# Patient Record
Sex: MALE | Race: BLACK OR AFRICAN AMERICAN | NOT HISPANIC OR LATINO
[De-identification: names, ages, dates, MRNs, and addresses within clinical notes are randomized per-mention and may not be internally consistent; named-entity substitution may affect disease eponyms.]

---

## 2023-03-27 ENCOUNTER — APPOINTMENT (OUTPATIENT)
Dept: SURGERY | Facility: CLINIC | Age: 73
End: 2023-03-27
Payer: MEDICARE

## 2023-03-27 VITALS
HEART RATE: 60 BPM | BODY MASS INDEX: 26.66 KG/M2 | WEIGHT: 180 LBS | HEIGHT: 69 IN | DIASTOLIC BLOOD PRESSURE: 87 MMHG | TEMPERATURE: 98.2 F | SYSTOLIC BLOOD PRESSURE: 136 MMHG

## 2023-03-27 DIAGNOSIS — K40.90 UNILATERAL INGUINAL HERNIA, W/OUT OBSTRUCTION OR GANGRENE, NOT SPECIFIED AS RECURRENT: ICD-10-CM

## 2023-03-27 DIAGNOSIS — Z78.9 OTHER SPECIFIED HEALTH STATUS: ICD-10-CM

## 2023-03-27 PROCEDURE — 99203 OFFICE O/P NEW LOW 30 MIN: CPT

## 2023-03-27 RX ORDER — AMLODIPINE BESYLATE 5 MG/1
TABLET ORAL
Refills: 0 | Status: ACTIVE | COMMUNITY

## 2023-03-27 RX ORDER — LOSARTAN POTASSIUM 100 MG/1
TABLET, FILM COATED ORAL
Refills: 0 | Status: ACTIVE | COMMUNITY

## 2023-03-27 RX ORDER — TAMSULOSIN HYDROCHLORIDE 0.4 MG/1
0.4 CAPSULE ORAL
Refills: 0 | Status: ACTIVE | COMMUNITY

## 2023-03-27 RX ORDER — ATORVASTATIN CALCIUM 10 MG/1
10 TABLET, FILM COATED ORAL
Refills: 0 | Status: ACTIVE | COMMUNITY

## 2023-03-27 NOTE — PHYSICAL EXAM
[Respiratory Effort] : normal respiratory effort [Alert] : alert [Oriented to Person] : oriented to person [Oriented to Place] : oriented to place [Oriented to Time] : oriented to time [Calm] : calm [JVD] : no jugular venous distention  [Abdominal Masses] : No abdominal masses [Abdomen Tenderness] : ~T ~M No abdominal tenderness [de-identified] : SAMIA JACKSON NAD [de-identified] : EOMI [de-identified] : soft NT, ND + reducible RIH no recurrent LIH appreciated.

## 2023-03-27 NOTE — HISTORY OF PRESENT ILLNESS
[de-identified] : 71 yo male with h/o HTN, BPH, and HLD with h/o open LIH in 2013 presents today for evaluation of a right groin bulge which he states he has had for several years. He states he is now symptomatic and has to hold it in when doing certain activities. He denies any nausea or vomiting.

## 2023-04-07 ENCOUNTER — OUTPATIENT (OUTPATIENT)
Dept: OUTPATIENT SERVICES | Facility: HOSPITAL | Age: 73
LOS: 1 days | Discharge: HOME | End: 2023-04-07
Payer: MEDICARE

## 2023-04-07 VITALS
DIASTOLIC BLOOD PRESSURE: 81 MMHG | WEIGHT: 189.82 LBS | HEIGHT: 69 IN | HEART RATE: 61 BPM | TEMPERATURE: 98 F | SYSTOLIC BLOOD PRESSURE: 145 MMHG | OXYGEN SATURATION: 98 % | RESPIRATION RATE: 17 BRPM

## 2023-04-07 DIAGNOSIS — K40.90 UNILATERAL INGUINAL HERNIA, WITHOUT OBSTRUCTION OR GANGRENE, NOT SPECIFIED AS RECURRENT: ICD-10-CM

## 2023-04-07 DIAGNOSIS — Z01.818 ENCOUNTER FOR OTHER PREPROCEDURAL EXAMINATION: ICD-10-CM

## 2023-04-07 DIAGNOSIS — Z98.890 OTHER SPECIFIED POSTPROCEDURAL STATES: Chronic | ICD-10-CM

## 2023-04-07 DIAGNOSIS — E78.5 HYPERLIPIDEMIA, UNSPECIFIED: ICD-10-CM

## 2023-04-07 DIAGNOSIS — Z87.438 PERSONAL HISTORY OF OTHER DISEASES OF MALE GENITAL ORGANS: ICD-10-CM

## 2023-04-07 DIAGNOSIS — I10 ESSENTIAL (PRIMARY) HYPERTENSION: ICD-10-CM

## 2023-04-07 LAB
ANION GAP SERPL CALC-SCNC: 3 MMOL/L — LOW (ref 5–17)
BASOPHILS # BLD AUTO: 0.04 K/UL — SIGNIFICANT CHANGE UP (ref 0–0.2)
BASOPHILS NFR BLD AUTO: 0.6 % — SIGNIFICANT CHANGE UP (ref 0–2)
BUN SERPL-MCNC: 18 MG/DL — SIGNIFICANT CHANGE UP (ref 7–23)
CALCIUM SERPL-MCNC: 10.5 MG/DL — HIGH (ref 8.5–10.1)
CHLORIDE SERPL-SCNC: 109 MMOL/L — HIGH (ref 96–108)
CO2 SERPL-SCNC: 30 MMOL/L — SIGNIFICANT CHANGE UP (ref 22–31)
CREAT SERPL-MCNC: 1.43 MG/DL — HIGH (ref 0.5–1.3)
EGFR: 52 ML/MIN/1.73M2 — LOW
EOSINOPHIL # BLD AUTO: 0.07 K/UL — SIGNIFICANT CHANGE UP (ref 0–0.5)
EOSINOPHIL NFR BLD AUTO: 1 % — SIGNIFICANT CHANGE UP (ref 0–6)
GLUCOSE SERPL-MCNC: 113 MG/DL — HIGH (ref 70–99)
HCT VFR BLD CALC: 44.2 % — SIGNIFICANT CHANGE UP (ref 39–50)
HGB BLD-MCNC: 14.3 G/DL — SIGNIFICANT CHANGE UP (ref 13–17)
IMM GRANULOCYTES NFR BLD AUTO: 0.1 % — SIGNIFICANT CHANGE UP (ref 0–0.9)
LYMPHOCYTES # BLD AUTO: 1.23 K/UL — SIGNIFICANT CHANGE UP (ref 1–3.3)
LYMPHOCYTES # BLD AUTO: 18.4 % — SIGNIFICANT CHANGE UP (ref 13–44)
MCHC RBC-ENTMCNC: 29.7 PG — SIGNIFICANT CHANGE UP (ref 27–34)
MCHC RBC-ENTMCNC: 32.4 G/DL — SIGNIFICANT CHANGE UP (ref 32–36)
MCV RBC AUTO: 91.7 FL — SIGNIFICANT CHANGE UP (ref 80–100)
MONOCYTES # BLD AUTO: 0.65 K/UL — SIGNIFICANT CHANGE UP (ref 0–0.9)
MONOCYTES NFR BLD AUTO: 9.7 % — SIGNIFICANT CHANGE UP (ref 2–14)
NEUTROPHILS # BLD AUTO: 4.69 K/UL — SIGNIFICANT CHANGE UP (ref 1.8–7.4)
NEUTROPHILS NFR BLD AUTO: 70.2 % — SIGNIFICANT CHANGE UP (ref 43–77)
NRBC # BLD: 0 /100 WBCS — SIGNIFICANT CHANGE UP (ref 0–0)
PLATELET # BLD AUTO: 209 K/UL — SIGNIFICANT CHANGE UP (ref 150–400)
POTASSIUM SERPL-MCNC: 4.1 MMOL/L — SIGNIFICANT CHANGE UP (ref 3.5–5.3)
POTASSIUM SERPL-SCNC: 4.1 MMOL/L — SIGNIFICANT CHANGE UP (ref 3.5–5.3)
RBC # BLD: 4.82 M/UL — SIGNIFICANT CHANGE UP (ref 4.2–5.8)
RBC # FLD: 13.7 % — SIGNIFICANT CHANGE UP (ref 10.3–14.5)
SODIUM SERPL-SCNC: 142 MMOL/L — SIGNIFICANT CHANGE UP (ref 135–145)
WBC # BLD: 6.69 K/UL — SIGNIFICANT CHANGE UP (ref 3.8–10.5)
WBC # FLD AUTO: 6.69 K/UL — SIGNIFICANT CHANGE UP (ref 3.8–10.5)

## 2023-04-07 PROCEDURE — 93010 ELECTROCARDIOGRAM REPORT: CPT

## 2023-04-07 NOTE — H&P PST ADULT - PROBLEM SELECTOR PLAN 5
Preop instructions provided including NPO status. Hibiclens wash for infection control. Patient aware to stop NSAID, OTC herbals  for 7-10 days, needs to be accompanied  by adult upon discharge.  Patient verbalized understanding  medcial clearance   anesthesiologist to review pst labs, ekg, medical clearances and optimization for surgery

## 2023-04-07 NOTE — H&P PST ADULT - NSANTHOSAYNRD_GEN_A_CORE
No. NOVA screening performed.  STOP BANG Legend: 0-2 = LOW Risk; 3-4 = INTERMEDIATE Risk; 5-8 = HIGH Risk

## 2023-04-07 NOTE — H&P PST ADULT - NSICDXPASTMEDICALHX_GEN_ALL_CORE_FT
PAST MEDICAL HISTORY:  History of BPH     HLD (hyperlipidemia)     Hypertension     Osteoarthritis of hip

## 2023-04-07 NOTE — H&P PST ADULT - ASSESSMENT
right inguinal hernia   CAPRINI SCORE    AGE RELATED RISK FACTORS                                                       MOBILITY RELATED FACTORS  [ ] Age 41-60 years                                            (1 Point)                  [ ] Bed rest                                                        (1 Point)  [x ] Age: 61-74 years                                           (2 Points)                [ ] Plaster cast                                                   (2 Points)  [ ] Age= 75 years                                              (3 Points)                 [ ] Bed bound for more than 72 hours                   (2 Points)    DISEASE RELATED RISK FACTORS                                               GENDER SPECIFIC FACTORS  [ ] Edema in the lower extremities                       (1 Point)                  [ ] Pregnancy                                                     (1 Point)  [ ] Varicose veins                                               (1 Point)                  [ ] Post-partum < 6 weeks                                   (1 Point)             [ x] BMI > 25 Kg/m2                                            (1 Point)                  [ ] Hormonal therapy  or oral contraception            (1 Point)                 [ ] Sepsis (in the previous month)                        (1 Point)                  [ ] History of pregnancy complications  [ ] Pneumonia or serious lung disease                                               [ ] Unexplained or recurrent                       (1 Point)           (in the previous month)                               (1 Point)  [ ] Abnormal pulmonary function test                     (1 Point)                 SURGERY RELATED RISK FACTORS  [ ] Acute myocardial infarction                              (1 Point)                 [ ]  Section                                            (1 Point)  [ ] Congestive heart failure (in the previous month)  (1 Point)                 [ ] Minor surgery                                                 (1 Point)   [ ] Inflammatory bowel disease                             (1 Point)                 [ ] Arthroscopic surgery                                        (2 Points)  [ ] Central venous access                                    (2 Points)                [x ] General surgery lasting more than 45 minutes   (2 Points)       [ ] Stroke (in the previous month)                          (5 Points)               [ ] Elective arthroplasty                                        (5 Points)                                                                                                                                               HEMATOLOGY RELATED FACTORS                                                 TRAUMA RELATED RISK FACTORS  [ ] Prior episodes of VTE                                     (3 Points)                 [ ] Fracture of the hip, pelvis, or leg                       (5 Points)  [ ] Positive family history for VTE                         (3 Points)                 [ ] Acute spinal cord injury (in the previous month)  (5 Points)  [ ] Prothrombin 36658 A                                      (3 Points)                 [ ] Paralysis  (less than 1 month)                          (5 Points)  [ ] Factor V Leiden                                             (3 Points)                 [ ] Multiple Trauma within 1 month                         (5 Points)  [ ] Lupus anticoagulants                                     (3 Points)                                                           [ ] Anticardiolipin antibodies                                (3 Points)                                                       [ ] High homocysteine in the blood                      (3 Points)                                             [ ] Other congenital or acquired thrombophilia       (3 Points)                                                [ ] Heparin induced thrombocytopenia                  (3 Points)                                          Total Score [     5     ]  Caprini Score 0-2: Low risk, No VTE Prophylaxis required for most patient's, encourage ambulation  Caprini Score 3-6: At Risk, Pharmacologic VTE prophylaxis is indicated for most patients ( in the absence of a contraindication)  Caprini Score Greater than or = 7: High Risk , pharmacologic VTE is indicated for most patients ( in the absence of a contraindication)    Caprini score indicates that the patient is high risk for VTE event ( score 6 or greater). Surgical patient's in this group will benefit from both pharmacologic prophylaxis and intermittent compression devices . Surgical team will determine the balance between VTE  risk and bleeding risk and other clinical considerations

## 2023-04-10 RX ORDER — SODIUM CHLORIDE 9 MG/ML
3 INJECTION INTRAMUSCULAR; INTRAVENOUS; SUBCUTANEOUS EVERY 8 HOURS
Refills: 0 | Status: DISCONTINUED | OUTPATIENT
Start: 2023-04-18 | End: 2023-05-02

## 2023-04-17 ENCOUNTER — TRANSCRIPTION ENCOUNTER (OUTPATIENT)
Age: 73
End: 2023-04-17

## 2023-04-18 ENCOUNTER — TRANSCRIPTION ENCOUNTER (OUTPATIENT)
Age: 73
End: 2023-04-18

## 2023-04-18 ENCOUNTER — OUTPATIENT (OUTPATIENT)
Dept: OUTPATIENT SERVICES | Facility: HOSPITAL | Age: 73
LOS: 1 days | Discharge: HOME | End: 2023-04-18
Payer: MEDICARE

## 2023-04-18 ENCOUNTER — APPOINTMENT (OUTPATIENT)
Dept: SURGERY | Facility: HOSPITAL | Age: 73
End: 2023-04-18

## 2023-04-18 VITALS
RESPIRATION RATE: 17 BRPM | DIASTOLIC BLOOD PRESSURE: 87 MMHG | HEART RATE: 57 BPM | SYSTOLIC BLOOD PRESSURE: 135 MMHG | WEIGHT: 179.9 LBS | TEMPERATURE: 98 F | OXYGEN SATURATION: 99 % | HEIGHT: 69 IN

## 2023-04-18 VITALS
OXYGEN SATURATION: 96 % | DIASTOLIC BLOOD PRESSURE: 79 MMHG | SYSTOLIC BLOOD PRESSURE: 143 MMHG | HEART RATE: 75 BPM | RESPIRATION RATE: 14 BRPM

## 2023-04-18 DIAGNOSIS — Z98.890 OTHER SPECIFIED POSTPROCEDURAL STATES: Chronic | ICD-10-CM

## 2023-04-18 PROCEDURE — 49651 LAP ING HERNIA REPAIR RECUR: CPT | Mod: GC,RT

## 2023-04-18 PROCEDURE — 49507 PRP I/HERN INIT BLOCK >5 YR: CPT | Mod: GC,RT

## 2023-04-18 DEVICE — TROCAR COVIDIEN SPACEMAKER PRO BLUNT TIP 10MM-12MM WITH DISSECTION BALLOON OVAL: Type: IMPLANTABLE DEVICE | Site: RIGHT | Status: FUNCTIONAL

## 2023-04-18 DEVICE — ARISTA 1GR: Type: IMPLANTABLE DEVICE | Site: RIGHT | Status: FUNCTIONAL

## 2023-04-18 DEVICE — MESH HERNIA INGUINAL PROGRIP LAPAROSCOPIC 15 X 10CM RIGHT: Type: IMPLANTABLE DEVICE | Site: RIGHT | Status: FUNCTIONAL

## 2023-04-18 DEVICE — CLIP APPLIER COVIDIEN ENDOCLIP III 5MM: Type: IMPLANTABLE DEVICE | Site: RIGHT | Status: FUNCTIONAL

## 2023-04-18 DEVICE — SURGICEL 4 X 8": Type: IMPLANTABLE DEVICE | Site: RIGHT | Status: FUNCTIONAL

## 2023-04-18 RX ORDER — TAMSULOSIN HYDROCHLORIDE 0.4 MG/1
1 CAPSULE ORAL
Refills: 0 | DISCHARGE

## 2023-04-18 RX ORDER — ATORVASTATIN CALCIUM 80 MG/1
1 TABLET, FILM COATED ORAL
Refills: 0 | DISCHARGE

## 2023-04-18 RX ORDER — HYDROMORPHONE HYDROCHLORIDE 2 MG/ML
0.5 INJECTION INTRAMUSCULAR; INTRAVENOUS; SUBCUTANEOUS
Refills: 0 | Status: DISCONTINUED | OUTPATIENT
Start: 2023-04-18 | End: 2023-04-18

## 2023-04-18 RX ORDER — ONDANSETRON 8 MG/1
4 TABLET, FILM COATED ORAL ONCE
Refills: 0 | Status: DISCONTINUED | OUTPATIENT
Start: 2023-04-18 | End: 2023-04-18

## 2023-04-18 RX ORDER — HYDROMORPHONE HYDROCHLORIDE 2 MG/ML
1 INJECTION INTRAMUSCULAR; INTRAVENOUS; SUBCUTANEOUS
Refills: 0 | Status: DISCONTINUED | OUTPATIENT
Start: 2023-04-18 | End: 2023-04-18

## 2023-04-18 RX ORDER — LOSARTAN/HYDROCHLOROTHIAZIDE 100MG-25MG
1 TABLET ORAL
Refills: 0 | DISCHARGE

## 2023-04-18 RX ORDER — SODIUM CHLORIDE 9 MG/ML
1000 INJECTION, SOLUTION INTRAVENOUS
Refills: 0 | Status: DISCONTINUED | OUTPATIENT
Start: 2023-04-18 | End: 2023-04-18

## 2023-04-18 RX ADMIN — SODIUM CHLORIDE 100 MILLILITER(S): 9 INJECTION, SOLUTION INTRAVENOUS at 11:19

## 2023-04-18 NOTE — ASU DISCHARGE PLAN (ADULT/PEDIATRIC) - NS MD DC FALL RISK RISK
For information on Fall & Injury Prevention, visit: [email or web address] OR  [email or web address] OR  https://www.cdc.gov/steadi/patient.html

## 2023-04-18 NOTE — ASU PATIENT PROFILE, ADULT - TEACHING/LEARNING FACTORS INFLUENCE READINESS TO LEARN
All normal labs. TSH, FT4 and FT3 included. Chol good. Vit B12 (given FH of neuropathy) normal as well. none

## 2023-04-18 NOTE — BRIEF OPERATIVE NOTE - NSICDXBRIEFPROCEDURE_GEN_ALL_CORE_FT
PROCEDURES:  Laparoscopic herniorrhaphy of right inguinal hernia by total extraperitoneal approach 18-Apr-2023 09:19:21  Dagoberto Castro

## 2023-04-19 PROBLEM — E78.5 HYPERLIPIDEMIA, UNSPECIFIED: Chronic | Status: ACTIVE | Noted: 2023-04-07

## 2023-04-19 PROBLEM — Z87.438 PERSONAL HISTORY OF OTHER DISEASES OF MALE GENITAL ORGANS: Chronic | Status: ACTIVE | Noted: 2023-04-07

## 2023-04-20 DIAGNOSIS — K40.90 UNILATERAL INGUINAL HERNIA, WITHOUT OBSTRUCTION OR GANGRENE, NOT SPECIFIED AS RECURRENT: ICD-10-CM

## 2023-04-20 DIAGNOSIS — K40.30 UNILATERAL INGUINAL HERNIA, WITH OBSTRUCTION, WITHOUT GANGRENE, NOT SPECIFIED AS RECURRENT: ICD-10-CM

## 2023-04-26 ENCOUNTER — APPOINTMENT (OUTPATIENT)
Dept: SURGERY | Facility: CLINIC | Age: 73
End: 2023-04-26
Payer: MEDICARE

## 2023-04-26 VITALS
TEMPERATURE: 98.4 F | HEIGHT: 69 IN | BODY MASS INDEX: 26.66 KG/M2 | SYSTOLIC BLOOD PRESSURE: 153 MMHG | OXYGEN SATURATION: 96 % | WEIGHT: 180 LBS | DIASTOLIC BLOOD PRESSURE: 78 MMHG | HEART RATE: 76 BPM

## 2023-04-26 PROCEDURE — 99211 OFF/OP EST MAY X REQ PHY/QHP: CPT | Mod: 24

## 2023-04-26 NOTE — ASSESSMENT
[FreeTextEntry1] : Patient is well, has no complaints. \par \par \par incisions are c/d/i and healing well.\par \par groin with minimal swelling.\par \par \par \par f/u prn

## 2025-02-10 NOTE — H&P PST ADULT - TOBACCO USE
Airway  Date/Time: 2/10/2025 8:17 AM  Urgency: elective      General Information and Staff    Patient location during procedure: OR  Anesthesiologist: Erickson Hays MD  Performed: anesthesiologist   Performed by: Erickson Hays MD  Authorized by: Erickson Hays MD      Indications and Patient Condition  Indications for airway management: anesthesia  Sedation level: deep  Preoxygenated: yes  Patient position: sniffing  Mask difficulty assessment: 1 - vent by mask    Final Airway Details  Final airway type: endotracheal airway      Successful airway: ETT  Cuffed: yes   Successful intubation technique: direct laryngoscopy  Endotracheal tube insertion site: oral  Blade: Grabiel  Blade size: #3    Cormack-Lehane Classification: grade I - full view of glottis  Placement verified by: capnometry   Measured from: lips  ETT to lips (cm): 21  Number of attempts at approach: 1    Additional Comments  atraumatic       Former smoker

## (undated) DEVICE — WARMING BLANKET FULL UNDERBODY

## (undated) DEVICE — NDL HYPO SAFE 22G X 1.5" (BLACK)

## (undated) DEVICE — FRA-ESU BOVIE FORCE FX F3B25575A: Type: DURABLE MEDICAL EQUIPMENT

## (undated) DEVICE — SUT BIOSYN 4-0 18" P-12

## (undated) DEVICE — TUBING OLYMPUS INSUFFLATION

## (undated) DEVICE — PACK GENERAL LAPAROSCOPY

## (undated) DEVICE — SUT VICRYL 3-0 27" SH UNDYED

## (undated) DEVICE — TUBING INSUFFLATION LAP FILTER 10FT

## (undated) DEVICE — SUT VICRYL 0 27" UR-6

## (undated) DEVICE — SHEARS COVIDIEN ENDO SHEAR 5MM X 31CM W UNIPOLAR CAUTERY

## (undated) DEVICE — SYR LUER LOK 10CC

## (undated) DEVICE — VENODYNE/SCD SLEEVE CALF MEDIUM

## (undated) DEVICE — DRSG STERISTRIPS 0.25 X 3"

## (undated) DEVICE — TUBING STRYKEFLOW II SUCTION / IRRIGATOR

## (undated) DEVICE — GLV 7.5 PROTEXIS (WHITE)